# Patient Record
Sex: MALE | Race: OTHER | Employment: FULL TIME | URBAN - METROPOLITAN AREA
[De-identification: names, ages, dates, MRNs, and addresses within clinical notes are randomized per-mention and may not be internally consistent; named-entity substitution may affect disease eponyms.]

---

## 2018-06-06 ENCOUNTER — HOSPITAL ENCOUNTER (EMERGENCY)
Facility: HOSPITAL | Age: 53
Discharge: HOME | End: 2018-06-06
Payer: COMMERCIAL

## 2018-06-06 ENCOUNTER — APPOINTMENT (OUTPATIENT)
Dept: GENERAL RADIOLOGY | Facility: HOSPITAL | Age: 53
End: 2018-06-06
Attending: NURSE PRACTITIONER
Payer: COMMERCIAL

## 2018-06-06 ENCOUNTER — APPOINTMENT (OUTPATIENT)
Dept: GENERAL RADIOLOGY | Facility: HOSPITAL | Age: 53
End: 2018-06-06
Payer: COMMERCIAL

## 2018-06-06 VITALS
BODY MASS INDEX: 30.48 KG/M2 | RESPIRATION RATE: 18 BRPM | SYSTOLIC BLOOD PRESSURE: 133 MMHG | OXYGEN SATURATION: 96 % | TEMPERATURE: 97 F | DIASTOLIC BLOOD PRESSURE: 102 MMHG | HEART RATE: 82 BPM | HEIGHT: 73 IN | WEIGHT: 230 LBS

## 2018-06-06 DIAGNOSIS — S83.92XA SPRAIN OF LEFT KNEE/LEG, INITIAL ENCOUNTER: Primary | ICD-10-CM

## 2018-06-06 PROCEDURE — 73560 X-RAY EXAM OF KNEE 1 OR 2: CPT

## 2018-06-06 PROCEDURE — 73502 X-RAY EXAM HIP UNI 2-3 VIEWS: CPT | Performed by: NURSE PRACTITIONER

## 2018-06-06 PROCEDURE — 99284 EMERGENCY DEPT VISIT MOD MDM: CPT

## 2018-06-06 PROCEDURE — 73610 X-RAY EXAM OF ANKLE: CPT | Performed by: NURSE PRACTITIONER

## 2018-06-06 NOTE — ED INITIAL ASSESSMENT (HPI)
c/o left leg/knee pain after falling about 1 hour ago. Pt slipped at [facility] and states he did the \"splits\" and his left knee turned inwards. Pt now complains of tingling sensation in toes and foot traveling up calf.  Denies any other trauma/injury

## 2018-06-06 NOTE — ED PROVIDER NOTES
Patient Seen in: [facility] AND [facility] Emergency Department    History   CC: leg pain  HPI: Allie Like 45year old male  who presents to the ER c/o left LE pain s/p injury today in which pt was [place]  [place] daughter when he sli noted above. PSFH elements reviewed from today and agreed except as otherwise stated in HPI. Medications :  Not on File           Constitutional and vital signs reviewed.         Physical Exam   ED Triage Vitals [06/06/18 1629]  BP: (!) 133/102  Pulse more time passes, the discomfort decreases. States he is able to move his leg, touches leg and put weight on his leg more and more without as much discomfort. Advised on general sprain/strain instructions, follow-up and return precautions.   Patient miranda

## 2019-05-28 ENCOUNTER — HOSPITAL ENCOUNTER (OUTPATIENT)
Dept: GENERAL RADIOLOGY | Facility: HOSPITAL | Age: 54
Discharge: HOME | End: 2019-05-28
Attending: PHYSICIAN ASSISTANT
Payer: COMMERCIAL

## 2019-05-28 ENCOUNTER — OFFICE VISIT (OUTPATIENT)
Dept: INTERNAL MEDICINE CLINIC | Facility: CLINIC | Age: 54
End: 2019-05-28
Payer: COMMERCIAL

## 2019-05-28 VITALS
RESPIRATION RATE: 16 BRPM | WEIGHT: 225 LBS | BODY MASS INDEX: 29.82 KG/M2 | DIASTOLIC BLOOD PRESSURE: 76 MMHG | HEIGHT: 73 IN | SYSTOLIC BLOOD PRESSURE: 128 MMHG | HEART RATE: 65 BPM

## 2019-05-28 DIAGNOSIS — R07.81 RIB PAIN ON RIGHT SIDE: ICD-10-CM

## 2019-05-28 DIAGNOSIS — R07.81 RIB PAIN ON RIGHT SIDE: Primary | ICD-10-CM

## 2019-05-28 PROCEDURE — 71100 X-RAY EXAM RIBS UNI 2 VIEWS: CPT | Performed by: PHYSICIAN ASSISTANT

## 2019-05-28 PROCEDURE — 99212 OFFICE O/P EST SF 10 MIN: CPT | Performed by: PHYSICIAN ASSISTANT

## 2019-05-28 PROCEDURE — 99213 OFFICE O/P EST LOW 20 MIN: CPT | Performed by: PHYSICIAN ASSISTANT

## 2019-05-28 NOTE — PROGRESS NOTES
HPI:    Patient ID: Danyell Lubin is a 52year old male. HPI   Patient presents complaining of right rib pain for 6 weeks.  He states good Friday weekend he was out playing volleyball and went for a dive and hit the right side of his rib, had intermitten Body mass index is 29.69 kg/m². Physical Exam    Constitutional: He is oriented to person, place, and time. He appears well-developed and well-nourished. HENT:   Head: Normocephalic and atraumatic.    Nose: Nose normal.   Eyes: Pupils are equal, round, a

## 2019-05-31 ENCOUNTER — LAB ENCOUNTER (OUTPATIENT)
Dept: LAB | Age: 54
End: 2019-05-31
Attending: FAMILY MEDICINE
Payer: COMMERCIAL

## 2019-05-31 ENCOUNTER — OFFICE VISIT (OUTPATIENT)
Dept: FAMILY MEDICINE CLINIC | Facility: CLINIC | Age: 54
End: 2019-05-31
Payer: COMMERCIAL

## 2019-05-31 VITALS
HEART RATE: 69 BPM | HEIGHT: 73.4 IN | DIASTOLIC BLOOD PRESSURE: 71 MMHG | WEIGHT: 221 LBS | BODY MASS INDEX: 28.97 KG/M2 | SYSTOLIC BLOOD PRESSURE: 119 MMHG | TEMPERATURE: 98 F

## 2019-05-31 DIAGNOSIS — R79.89 LOW SERUM VITAMIN D: ICD-10-CM

## 2019-05-31 DIAGNOSIS — Z00.00 ROUTINE GENERAL MEDICAL EXAMINATION AT A HEALTH CARE FACILITY: Primary | ICD-10-CM

## 2019-05-31 DIAGNOSIS — Z00.00 ROUTINE GENERAL MEDICAL EXAMINATION AT A HEALTH CARE FACILITY: ICD-10-CM

## 2019-05-31 DIAGNOSIS — G47.33 OSA (OBSTRUCTIVE SLEEP APNEA): ICD-10-CM

## 2019-05-31 PROCEDURE — 85025 COMPLETE CBC W/AUTO DIFF WBC: CPT

## 2019-05-31 PROCEDURE — 80053 COMPREHEN METABOLIC PANEL: CPT

## 2019-05-31 PROCEDURE — 99396 PREV VISIT EST AGE 40-64: CPT | Performed by: FAMILY MEDICINE

## 2019-05-31 PROCEDURE — 80061 LIPID PANEL: CPT

## 2019-05-31 PROCEDURE — 36415 COLL VENOUS BLD VENIPUNCTURE: CPT

## 2019-05-31 PROCEDURE — 82306 VITAMIN D 25 HYDROXY: CPT

## 2019-05-31 NOTE — PROGRESS NOTES
HPI:    Patient ID: Lam Polanco is a 52year old male. HPI  Patient presents with:  Physical: annual physical, wants to know if he still has endurance     Review of Systems   Constitutional: Negative. HENT: Negative. Eyes: Negative.     Respirato Lymphadenopathy:     He has no cervical adenopathy. Neurological: He is alert and oriented to person, place, and time. He has normal strength and normal reflexes. No sensory deficit.    Skin:   No suspicious lesions waist up exam   Psychiatric: His mood

## 2019-06-03 ENCOUNTER — OFFICE VISIT (OUTPATIENT)
Dept: ORTHOPEDICS CLINIC | Facility: CLINIC | Age: 54
End: 2019-06-03
Payer: COMMERCIAL

## 2019-06-03 ENCOUNTER — HOSPITAL ENCOUNTER (OUTPATIENT)
Dept: GENERAL RADIOLOGY | Facility: HOSPITAL | Age: 54
Discharge: HOME | End: 2019-06-03
Attending: ORTHOPAEDIC SURGERY
Payer: COMMERCIAL

## 2019-06-03 VITALS — BODY MASS INDEX: 29.82 KG/M2 | HEIGHT: 73 IN | WEIGHT: 225 LBS

## 2019-06-03 DIAGNOSIS — M25.562 LEFT KNEE PAIN, UNSPECIFIED CHRONICITY: ICD-10-CM

## 2019-06-03 DIAGNOSIS — S83.232A COMPLEX TEAR OF MEDIAL MENISCUS OF LEFT KNEE AS CURRENT INJURY, INITIAL ENCOUNTER: Primary | ICD-10-CM

## 2019-06-03 DIAGNOSIS — M25.561 RIGHT KNEE PAIN, UNSPECIFIED CHRONICITY: ICD-10-CM

## 2019-06-03 PROCEDURE — 73564 X-RAY EXAM KNEE 4 OR MORE: CPT | Performed by: ORTHOPAEDIC SURGERY

## 2019-06-03 PROCEDURE — 99212 OFFICE O/P EST SF 10 MIN: CPT | Performed by: ORTHOPAEDIC SURGERY

## 2019-06-03 PROCEDURE — 99243 OFF/OP CNSLTJ NEW/EST LOW 30: CPT | Performed by: ORTHOPAEDIC SURGERY

## 2019-06-03 NOTE — H&P
Chief Complaint: left knee pain    NURSING INTAKE COMMENTS: Patient presents with:  Knee Pain: fell 1 year ago, hyperextension injury to left knee. Underwent PT. Still has pain and limited ROM, swelling with increased activity.        History of present i None None   Swelling None None   Erythema None None   Atrophy None None        Strength        Quadriceps 5/5 5/5      Hamstrings 5/5 5/5        Joint line tenderness        Medial None +      Lateral None None      Pes anserinus None None      Patellar te

## 2019-06-10 ENCOUNTER — MED REC SCAN ONLY (OUTPATIENT)
Dept: ORTHOPEDICS CLINIC | Facility: CLINIC | Age: 54
End: 2019-06-10

## 2019-06-11 ENCOUNTER — TELEPHONE (OUTPATIENT)
Dept: FAMILY MEDICINE CLINIC | Facility: CLINIC | Age: 54
End: 2019-06-11

## 2019-06-13 ENCOUNTER — HOSPITAL ENCOUNTER (OUTPATIENT)
Dept: MRI IMAGING | Facility: HOSPITAL | Age: 54
Discharge: HOME | End: 2019-06-13
Attending: ORTHOPAEDIC SURGERY
Payer: COMMERCIAL

## 2019-06-13 DIAGNOSIS — M25.562 LEFT KNEE PAIN, UNSPECIFIED CHRONICITY: ICD-10-CM

## 2019-06-13 DIAGNOSIS — S83.232A COMPLEX TEAR OF MEDIAL MENISCUS OF LEFT KNEE AS CURRENT INJURY, INITIAL ENCOUNTER: ICD-10-CM

## 2019-06-13 PROCEDURE — 73721 MRI JNT OF LWR EXTRE W/O DYE: CPT | Performed by: ORTHOPAEDIC SURGERY

## 2019-06-21 ENCOUNTER — OFFICE VISIT (OUTPATIENT)
Dept: FAMILY MEDICINE CLINIC | Facility: CLINIC | Age: 54
End: 2019-06-21
Payer: COMMERCIAL

## 2019-06-21 VITALS
DIASTOLIC BLOOD PRESSURE: 66 MMHG | RESPIRATION RATE: 18 BRPM | HEIGHT: 73.5 IN | BODY MASS INDEX: 29.31 KG/M2 | HEART RATE: 68 BPM | SYSTOLIC BLOOD PRESSURE: 104 MMHG | WEIGHT: 226 LBS | TEMPERATURE: 98 F

## 2019-06-21 DIAGNOSIS — N52.9 ERECTILE DYSFUNCTION, UNSPECIFIED ERECTILE DYSFUNCTION TYPE: Primary | ICD-10-CM

## 2019-06-21 PROCEDURE — 99212 OFFICE O/P EST SF 10 MIN: CPT | Performed by: FAMILY MEDICINE

## 2019-06-21 PROCEDURE — 99213 OFFICE O/P EST LOW 20 MIN: CPT | Performed by: FAMILY MEDICINE

## 2019-06-21 RX ORDER — TADALAFIL 20 MG/1
20 TABLET ORAL AS NEEDED
Qty: 6 TABLET | Refills: 3 | Status: SHIPPED | OUTPATIENT
Start: 2019-06-21 | End: 2020-08-14

## 2019-06-21 NOTE — PROGRESS NOTES
HPI:    Patient ID: Geovanna Chambers is a 52year old male. HPI  Patient presents with:  Sexual Problem  inability to obtain erection  Review of Systems   Genitourinary: Negative. Current Outpatient Medications:   Tadalafil (CIALIS) 20 MG Oral T

## 2019-06-27 ENCOUNTER — OFFICE VISIT (OUTPATIENT)
Dept: ORTHOPEDICS CLINIC | Facility: CLINIC | Age: 54
End: 2019-06-27
Payer: COMMERCIAL

## 2019-06-27 DIAGNOSIS — S83.272A COMPLEX TEAR OF LATERAL MENISCUS OF LEFT KNEE AS CURRENT INJURY, INITIAL ENCOUNTER: ICD-10-CM

## 2019-06-27 DIAGNOSIS — S83.232A COMPLEX TEAR OF MEDIAL MENISCUS OF LEFT KNEE AS CURRENT INJURY, INITIAL ENCOUNTER: Primary | ICD-10-CM

## 2019-06-27 PROCEDURE — 99213 OFFICE O/P EST LOW 20 MIN: CPT | Performed by: ORTHOPAEDIC SURGERY

## 2019-06-27 PROCEDURE — 99212 OFFICE O/P EST SF 10 MIN: CPT | Performed by: ORTHOPAEDIC SURGERY

## 2019-06-27 NOTE — H&P
Chief Complaint: left knee pain    NURSING INTAKE COMMENTS: Patient presents with:  Test Results: Here to discuss MRI left knee completed on 6/13      History of present illness: This 52year old male comes in for evaluation of his left knee pain.   We obt Hamstrings 5/5 5/5        Joint line tenderness        Medial +++ None      Lateral + None      Pes anserinus None None      Patellar tendon None None        Maximino + Negative             Stability Testing        Anterior Drawer Negative Negative      Po limited to the following: Anesthetic risk, bleeding, infection, blood clots, pulmonary embolus, knee pain, knee stiffness, and need for further surgery because of retearing. I also discussed the rehabilitation that would be necessary afterwards.   All the

## 2020-12-16 ENCOUNTER — TELEPHONE (OUTPATIENT)
Dept: FAMILY MEDICINE CLINIC | Facility: CLINIC | Age: 55
End: 2020-12-16

## 2020-12-16 DIAGNOSIS — Z20.822 EXPOSURE TO COVID-19 VIRUS: Primary | ICD-10-CM

## 2020-12-16 NOTE — TELEPHONE ENCOUNTER
Called pt for more information regarding message below. Pt  was exposed on 12/12/20 to someone that is COVID positive. States he went with that individual and 5 other people for a  walk outside and then sat around a bonfire.   may have been with

## 2020-12-16 NOTE — TELEPHONE ENCOUNTER
Asymptomatic, outside and he was masked. I recommend that he monitors for any symptoms and takes his temp daily for ten days from exposure.

## 2020-12-16 NOTE — TELEPHONE ENCOUNTER
Advised pt of providers note below. Pt verbalized understanding and no further questions or concerns.

## 2021-04-05 ENCOUNTER — IMMUNIZATION (OUTPATIENT)
Dept: LAB | Age: 56
End: 2021-04-05
Attending: HOSPITALIST
Payer: COMMERCIAL

## 2021-04-05 DIAGNOSIS — Z23 NEED FOR VACCINATION: Primary | ICD-10-CM

## 2021-04-05 PROCEDURE — 0001A SARSCOV2 VAC 30MCG/0.3ML IM: CPT

## 2021-04-26 ENCOUNTER — IMMUNIZATION (OUTPATIENT)
Dept: LAB | Age: 56
End: 2021-04-26
Attending: HOSPITALIST
Payer: COMMERCIAL

## 2021-04-26 DIAGNOSIS — Z23 NEED FOR VACCINATION: Primary | ICD-10-CM

## 2021-04-26 PROCEDURE — 0002A SARSCOV2 VAC 30MCG/0.3ML IM: CPT

## 2021-11-23 ENCOUNTER — IMMUNIZATION (OUTPATIENT)
Dept: LAB | Facility: HOSPITAL | Age: 56
End: 2021-11-23
Attending: EMERGENCY MEDICINE
Payer: COMMERCIAL

## 2021-11-23 DIAGNOSIS — Z23 NEED FOR VACCINATION: Primary | ICD-10-CM

## 2021-11-23 PROCEDURE — 0004A SARSCOV2 VAC 30MCG/0.3ML IM: CPT | Performed by: NURSE PRACTITIONER

## 2022-11-22 ENCOUNTER — IMMUNIZATION (OUTPATIENT)
Dept: LAB | Age: 57
End: 2022-11-22
Attending: EMERGENCY MEDICINE
Payer: COMMERCIAL

## 2022-11-22 DIAGNOSIS — Z23 NEED FOR VACCINATION: Primary | ICD-10-CM

## 2022-11-22 PROCEDURE — 90471 IMMUNIZATION ADMIN: CPT

## 2022-11-22 PROCEDURE — 0124A SARSCOV2 VAC BVL 30MCG/0.3ML: CPT

## 2022-11-22 PROCEDURE — 90686 IIV4 VACC NO PRSV 0.5 ML IM: CPT

## (undated) NOTE — ED AVS SNAPSHOT
Renuka Heller   MRN: [id number]    Department:  [facility] Emergency Department   Date of Visit:  6/6/2018           Disclosure     Insurance plans vary and the physician(s) referred by the ER may not be covered by your plan.  Please contact you CARE PHYSICIAN AT ONCE OR RETURN IMMEDIATELY TO THE EMERGENCY DEPARTMENT. If you have been prescribed any medication(s), please fill your prescription right away and begin taking the medication(s) as directed.   If you believe that any of the medications